# Patient Record
Sex: FEMALE | Race: BLACK OR AFRICAN AMERICAN | URBAN - METROPOLITAN AREA
[De-identification: names, ages, dates, MRNs, and addresses within clinical notes are randomized per-mention and may not be internally consistent; named-entity substitution may affect disease eponyms.]

---

## 2021-12-30 ENCOUNTER — OFFICE VISIT (OUTPATIENT)
Dept: URBAN - METROPOLITAN AREA CLINIC 17 | Facility: CLINIC | Age: 51
End: 2021-12-30

## 2021-12-30 RX ORDER — ALPRAZOLAM 0.25 MG/1
TABLET ORAL
Qty: 0 | Refills: 0 | COMMUNITY
Start: 1900-01-01

## 2021-12-30 RX ORDER — BUTALBITAL, ACETAMINOPHEN, AND CAFFEINE 50; 300; 40 MG/1; MG/1; MG/1
CAPSULE ORAL
Qty: 0 | Refills: 0 | COMMUNITY
Start: 1900-01-01

## 2021-12-31 ENCOUNTER — TELEPHONE ENCOUNTER (OUTPATIENT)
Dept: URBAN - METROPOLITAN AREA CLINIC 92 | Facility: CLINIC | Age: 51
End: 2021-12-31

## 2021-12-31 ENCOUNTER — OFFICE VISIT (OUTPATIENT)
Dept: URBAN - METROPOLITAN AREA TELEHEALTH 2 | Facility: TELEHEALTH | Age: 51
End: 2021-12-31
Payer: COMMERCIAL

## 2021-12-31 DIAGNOSIS — K59.01 CONSTIPATION BY DELAYED COLONIC TRANSIT: ICD-10-CM

## 2021-12-31 DIAGNOSIS — K62.5 RECTAL BLEEDING: ICD-10-CM

## 2021-12-31 DIAGNOSIS — K21.9 GERD WITHOUT ESOPHAGITIS: ICD-10-CM

## 2021-12-31 DIAGNOSIS — K44.9 HIATAL HERNIA: ICD-10-CM

## 2021-12-31 PROCEDURE — 99214 OFFICE O/P EST MOD 30 MIN: CPT | Performed by: INTERNAL MEDICINE

## 2021-12-31 RX ORDER — SODIUM PICOSULFATE, MAGNESIUM OXIDE, AND ANHYDROUS CITRIC ACID 10; 3.5; 12 MG/160ML; G/160ML; G/160ML
160 ML LIQUID ORAL
Qty: 320 MILLILITER | Refills: 0 | OUTPATIENT
Start: 2021-12-31 | End: 2022-01-01

## 2021-12-31 RX ORDER — BUTALBITAL, ACETAMINOPHEN, AND CAFFEINE 50; 300; 40 MG/1; MG/1; MG/1
CAPSULE ORAL
Qty: 0 | Refills: 0 | COMMUNITY
Start: 1900-01-01

## 2021-12-31 RX ORDER — FAMOTIDINE 40 MG/1
1 TABLET AT BEDTIME TABLET, FILM COATED ORAL ONCE A DAY
Qty: 30 | Refills: 3 | OUTPATIENT
Start: 2021-12-31

## 2021-12-31 RX ORDER — LINACLOTIDE 290 UG/1
1 CAPSULE AT LEAST 30 MINUTES BEFORE THE FIRST MEAL OF THE DAY ON AN EMPTY STOMACH CAPSULE, GELATIN COATED ORAL ONCE A DAY
Qty: 90 | Refills: 3 | OUTPATIENT
Start: 2021-12-31 | End: 2022-12-26

## 2021-12-31 RX ORDER — ALPRAZOLAM 0.25 MG/1
TABLET ORAL
Qty: 0 | Refills: 0 | COMMUNITY
Start: 1900-01-01

## 2021-12-31 NOTE — HPI-TODAY'S VISIT:
This is a 50 yo female here for rectal bleeding.  She was in usual health until 2021 when she had a moderate amount of rectal bleeding.  One week later she noted a significant amount of rectal bleeding.  A colonoscopy  revealed intermal hemorrhoids.  She has a history of chronic constipation.  She was placed on Linzess 290 mcg which was effective.  When her prescription ran out she never requested a refill.   Stool evacuation occurs every 2 days but she does not thave the sensation of complete emptying.  She also reports external hemorrhoids which occassionally itch and result in discomfort.   In  she had external hemorrhoids removal which was very painful.  The patient has a history of long standing GERD.  She was on Omeprazole for a while until her prescription  2 years ago.   She describes belching, heartburn and nocturnal symptoms.  She denies vomiting, early satiety, dysphagia and weight loss.  An EGD  demonstrated a hiatal hernia.

## 2022-01-12 ENCOUNTER — OFFICE VISIT (OUTPATIENT)
Dept: URBAN - METROPOLITAN AREA SURGERY CENTER 30 | Facility: SURGERY CENTER | Age: 52
End: 2022-01-12

## 2022-01-12 ENCOUNTER — OFFICE VISIT (OUTPATIENT)
Dept: URBAN - METROPOLITAN AREA SURGERY CENTER 30 | Facility: SURGERY CENTER | Age: 52
End: 2022-01-12
Payer: COMMERCIAL

## 2022-01-12 DIAGNOSIS — K62.5 ANAL BLEEDING: ICD-10-CM

## 2022-01-12 PROCEDURE — 45378 DIAGNOSTIC COLONOSCOPY: CPT | Performed by: INTERNAL MEDICINE

## 2022-01-12 PROCEDURE — G8907 PT DOC NO EVENTS ON DISCHARG: HCPCS | Performed by: INTERNAL MEDICINE

## 2022-02-21 ENCOUNTER — TELEPHONE ENCOUNTER (OUTPATIENT)
Dept: URBAN - METROPOLITAN AREA CLINIC 92 | Facility: CLINIC | Age: 52
End: 2022-02-21

## 2022-02-21 ENCOUNTER — OFFICE VISIT (OUTPATIENT)
Dept: URBAN - METROPOLITAN AREA TELEHEALTH 2 | Facility: TELEHEALTH | Age: 52
End: 2022-02-21
Payer: COMMERCIAL

## 2022-02-21 DIAGNOSIS — K62.5 RECTAL BLEEDING: ICD-10-CM

## 2022-02-21 DIAGNOSIS — K44.9 HIATAL HERNIA: ICD-10-CM

## 2022-02-21 DIAGNOSIS — K21.9 GERD WITHOUT ESOPHAGITIS: ICD-10-CM

## 2022-02-21 DIAGNOSIS — K59.01 CONSTIPATION BY DELAYED COLONIC TRANSIT: ICD-10-CM

## 2022-02-21 DIAGNOSIS — K64.9 HEMORRHOIDS, UNSPECIFIED HEMORRHOID TYPE: ICD-10-CM

## 2022-02-21 PROCEDURE — 99213 OFFICE O/P EST LOW 20 MIN: CPT | Performed by: INTERNAL MEDICINE

## 2022-02-21 RX ORDER — HYDROCORTISONE ACETATE 25 MG/1
1 SUPPOSITORY SUPPOSITORY RECTAL TWICE A DAY
Qty: 60 | Refills: 0 | OUTPATIENT
Start: 2022-02-21 | End: 2022-03-23

## 2022-02-21 RX ORDER — TRAMADOL HYDROCHLORIDE 50 MG/1
TAKE ONE TABLET BY MOUTH EVERY 12 HOURS AS NEEDED FOR SEVERE PAIN TABLET ORAL
Qty: 60 | Refills: 0 | Status: ACTIVE | COMMUNITY

## 2022-02-21 RX ORDER — METAXALONE 800 MG/1
TAKE ONE TABLET BY MOUTH THREE TIMES A DAY FOR 3 DAYS TABLET ORAL
Qty: 10 | Refills: 0 | Status: ACTIVE | COMMUNITY

## 2022-02-21 RX ORDER — HYDROCORTISONE ACETATE 25 MG/1
1 SUPPOSITORY SUPPOSITORY RECTAL TWICE A DAY
Qty: 60 | Refills: 1 | OUTPATIENT

## 2022-02-21 RX ORDER — ALPRAZOLAM 0.25 MG/1
TABLET ORAL
Qty: 0 | Refills: 0 | Status: ACTIVE | COMMUNITY
Start: 1900-01-01

## 2022-02-21 RX ORDER — LINACLOTIDE 290 UG/1
1 CAPSULE AT LEAST 30 MINUTES BEFORE THE FIRST MEAL OF THE DAY ON AN EMPTY STOMACH CAPSULE, GELATIN COATED ORAL ONCE A DAY
OUTPATIENT
Start: 2021-12-31 | End: 2022-12-26

## 2022-02-21 RX ORDER — FREMANEZUMAB-VFRM 225 MG/1.5ML
INJECT ENTIRE CONTENTS OF ONE SYRINGE UNDER THE SKIN EVERY 28 DAYS INJECTION SUBCUTANEOUS
Qty: 1.5 | Refills: 5 | Status: ACTIVE | COMMUNITY

## 2022-02-21 RX ORDER — FAMOTIDINE 40 MG/1
1 TABLET AT BEDTIME TABLET, FILM COATED ORAL ONCE A DAY
Qty: 30 | Refills: 3 | OUTPATIENT

## 2022-02-21 RX ORDER — GABAPENTIN 100 MG/1
TAKE ONE CAPSULE BY MOUTH THREE TIMES A DAY CAPSULE ORAL
Qty: 90 | Refills: 1 | Status: ACTIVE | COMMUNITY

## 2022-02-21 RX ORDER — RIMEGEPANT SULFATE 75 MG/75MG
DISSOLVE ONE TABLET BY MOUTH ONE TIME DAILY AS NEEDED FOR MIGRAINE  MAXIMUM 1 DAILY TABLET, ORALLY DISINTEGRATING ORAL
Qty: 10 | Refills: 0 | Status: ACTIVE | COMMUNITY

## 2022-02-21 RX ORDER — LINACLOTIDE 145 UG/1
1 CAPSULE AT LEAST 30 MINUTES BEFORE THE FIRST MEAL CAPSULE, GELATIN COATED ORAL ONCE A DAY
Qty: 30 | Refills: 2 | OUTPATIENT
Start: 2022-02-21 | End: 2022-05-22

## 2022-02-21 RX ORDER — HYDROCORTISONE ACETATE 25 MG/1
1 SUPPOSITORY SUPPOSITORY RECTAL THREE TIMES A DAY
Qty: 90 | OUTPATIENT
Start: 2022-02-21 | End: 2022-03-23

## 2022-02-21 RX ORDER — FAMOTIDINE 40 MG/1
1 TABLET AT BEDTIME TABLET, FILM COATED ORAL ONCE A DAY
Qty: 30 | Refills: 3 | Status: ACTIVE | COMMUNITY
Start: 2021-12-31

## 2022-02-21 RX ORDER — LINACLOTIDE 290 UG/1
1 CAPSULE AT LEAST 30 MINUTES BEFORE THE FIRST MEAL OF THE DAY ON AN EMPTY STOMACH CAPSULE, GELATIN COATED ORAL ONCE A DAY
Qty: 90 | Refills: 3 | Status: ACTIVE | COMMUNITY
Start: 2021-12-31 | End: 2022-12-26

## 2022-02-21 RX ORDER — BUTALBITAL, ACETAMINOPHEN, AND CAFFEINE 50; 300; 40 MG/1; MG/1; MG/1
CAPSULE ORAL
Qty: 0 | Refills: 0 | COMMUNITY
Start: 1900-01-01

## 2022-02-21 RX ORDER — BUPROPION HYDROCHLORIDE 150 MG/1
TAKE ONE TABLET BY MOUTH ONE TIME DAILY TABLET, EXTENDED RELEASE ORAL
Qty: 90 | Refills: 0 | Status: ACTIVE | COMMUNITY

## 2022-02-21 NOTE — HPI-OTHER HISTORIES
(2021) This is a 50 yo female here for rectal bleeding.  She was in usual health until 2021 when she had a moderate amount of rectal bleeding.  One week later she noted a significant amount of rectal bleeding.  A colonoscopy  revealed intermal hemorrhoids.  She has a history of chronic constipation.  She was placed on Linzess 290 mcg which was effective.  When her prescription ran out she never requested a refill.   Stool evacuation occurs every 2 days but she does not thave the sensation of complete emptying.  She also reports external hemorrhoids which occassionally itch and result in discomfort.   In  she had external hemorrhoids removal which was very painful.  The patient has a history of long standing GERD.  She was on Omeprazole for a while until her prescription  2 years ago.   She describes belching, heartburn and nocturnal symptoms.  She denies vomiting, early satiety, dysphagia and weight loss.  An EGD  demonstrated a hiatal hernia.

## 2022-02-21 NOTE — HPI-TODAY'S VISIT:
The patient is here for follow up of  constipation and a  colonoscopy performed for rectal bleeding.  Linzess 290 mcg was prescribed.  Four days after taking the medication she developed severe stomach cramping and diarrhea.  She has never been on Trulance..  The colonscopy demonstrated hemorrhoids.  She denies rectal bleeding but admits to discomfort.  Preo H suppositories she reports are too long.  She is requesting Anusol which she had in the past.

## 2022-04-20 ENCOUNTER — DASHBOARD ENCOUNTERS (OUTPATIENT)
Age: 52
End: 2022-04-20

## 2022-04-27 ENCOUNTER — OFFICE VISIT (OUTPATIENT)
Dept: URBAN - METROPOLITAN AREA TELEHEALTH 2 | Facility: TELEHEALTH | Age: 52
End: 2022-04-27
Payer: COMMERCIAL

## 2022-04-27 DIAGNOSIS — K21.9 GERD WITHOUT ESOPHAGITIS: ICD-10-CM

## 2022-04-27 DIAGNOSIS — K62.5 RECTAL BLEEDING: ICD-10-CM

## 2022-04-27 DIAGNOSIS — K64.8 OTHER HEMORRHOIDS: ICD-10-CM

## 2022-04-27 DIAGNOSIS — K59.01 CONSTIPATION BY DELAYED COLONIC TRANSIT: ICD-10-CM

## 2022-04-27 PROBLEM — 235595009 GASTROESOPHAGEAL REFLUX DISEASE: Status: ACTIVE | Noted: 2022-02-21

## 2022-04-27 PROBLEM — 35298007: Status: ACTIVE | Noted: 2021-12-31

## 2022-04-27 PROCEDURE — 99213 OFFICE O/P EST LOW 20 MIN: CPT | Performed by: INTERNAL MEDICINE

## 2022-04-27 RX ORDER — FAMOTIDINE 40 MG/1
1 TABLET AT BEDTIME TABLET, FILM COATED ORAL ONCE A DAY
Qty: 30 | Refills: 3 | Status: ACTIVE | COMMUNITY

## 2022-04-27 RX ORDER — PLECANATIDE 3 MG/1
1 TABLET TABLET ORAL ONCE A DAY
Qty: 30 | Refills: 3 | OUTPATIENT
Start: 2022-04-27 | End: 2022-08-25

## 2022-04-27 RX ORDER — LINACLOTIDE 145 UG/1
1 CAPSULE AT LEAST 30 MINUTES BEFORE THE FIRST MEAL CAPSULE, GELATIN COATED ORAL ONCE A DAY
Qty: 30 | Refills: 2 | Status: ACTIVE | COMMUNITY
Start: 2022-02-21 | End: 2022-05-22

## 2022-04-27 RX ORDER — TRAMADOL HYDROCHLORIDE 50 MG/1
TAKE ONE TABLET BY MOUTH EVERY 12 HOURS AS NEEDED FOR SEVERE PAIN TABLET ORAL
Qty: 60 | Refills: 0 | Status: ACTIVE | COMMUNITY

## 2022-04-27 RX ORDER — FREMANEZUMAB-VFRM 225 MG/1.5ML
INJECT ENTIRE CONTENTS OF ONE SYRINGE UNDER THE SKIN EVERY 28 DAYS INJECTION SUBCUTANEOUS
Qty: 1.5 | Refills: 5 | Status: ACTIVE | COMMUNITY

## 2022-04-27 RX ORDER — GABAPENTIN 100 MG/1
TAKE ONE CAPSULE BY MOUTH THREE TIMES A DAY CAPSULE ORAL
Qty: 90 | Refills: 1 | Status: ACTIVE | COMMUNITY

## 2022-04-27 RX ORDER — BUPROPION HYDROCHLORIDE 150 MG/1
TAKE ONE TABLET BY MOUTH ONE TIME DAILY TABLET, EXTENDED RELEASE ORAL
Qty: 90 | Refills: 0 | Status: ACTIVE | COMMUNITY

## 2022-04-27 RX ORDER — HYDROCORTISONE ACETATE 25 MG/1
1 SUPPOSITORY SUPPOSITORY RECTAL TWICE A DAY
Qty: 60 | Refills: 1 | Status: ACTIVE | COMMUNITY

## 2022-04-27 RX ORDER — BUTALBITAL, ACETAMINOPHEN, AND CAFFEINE 50; 300; 40 MG/1; MG/1; MG/1
CAPSULE ORAL
Qty: 0 | Refills: 0 | Status: DISCONTINUED | COMMUNITY
Start: 1900-01-01

## 2022-04-27 RX ORDER — RIMEGEPANT SULFATE 75 MG/75MG
DISSOLVE ONE TABLET BY MOUTH ONE TIME DAILY AS NEEDED FOR MIGRAINE  MAXIMUM 1 DAILY TABLET, ORALLY DISINTEGRATING ORAL
Qty: 10 | Refills: 0 | Status: ACTIVE | COMMUNITY

## 2022-04-27 RX ORDER — ALPRAZOLAM 0.25 MG/1
TABLET ORAL
Qty: 0 | Refills: 0 | Status: ACTIVE | COMMUNITY
Start: 1900-01-01

## 2022-04-27 RX ORDER — HYDROCORTISONE ACETATE 25 MG/1
1 SUPPOSITORY SUPPOSITORY RECTAL TWICE A DAY
Qty: 60 | Refills: 1 | OUTPATIENT

## 2022-04-27 RX ORDER — METAXALONE 800 MG/1
TAKE ONE TABLET BY MOUTH THREE TIMES A DAY FOR 3 DAYS TABLET ORAL
Qty: 10 | Refills: 0 | Status: ACTIVE | COMMUNITY

## 2022-04-27 NOTE — HPI-TODAY'S VISIT:
Ths is a 50 yo female here for follow up of constipation and hemorroids.  Linzess 145 mcg prescribed last visit caused too much diarrhea and cramoing.   Anusol suppositories were prescribed last visit which worked better than Prep H.

## 2022-04-27 NOTE — HPI-OTHER HISTORIES
(2021) This is a 50 yo female here for rectal bleeding.  She was in usual health until 2021 when she had a moderate amount of rectal bleeding.  One week later she noted a significant amount of rectal bleeding.  A colonoscopy  revealed intermal hemorrhoids.  She has a history of chronic constipation.  She was placed on Linzess 290 mcg which was effective.  When her prescription ran out she never requested a refill.   Stool evacuation occurs every 2 days but she does not thave the sensation of complete emptying.  She also reports external hemorrhoids which occassionally itch and result in discomfort.   In  she had external hemorrhoids removal which was very painful.  The patient has a history of long standing GERD.  She was on Omeprazole for a while until her prescription  2 years ago.   She describes belching, heartburn and nocturnal symptoms.  She denies vomiting, early satiety, dysphagia and weight loss.  An EGD  demonstrated a hiatal hernia.   (2022) The patient is here for follow up of  constipation and a  colonoscopy performed for rectal bleeding.  Linzess 290 mcg was prescribed.  Four days after taking the medication she developed severe stomach cramping and diarrhea.  She has never been on Trulance..  The colonscopy demonstrated hemorrhoids.  She denies rectal bleeding but admits to discomfort.  Preo H suppositories she reports are too long.  She is requesting Anusol which she had in the past.

## 2022-05-12 ENCOUNTER — TELEPHONE ENCOUNTER (OUTPATIENT)
Dept: URBAN - METROPOLITAN AREA CLINIC 95 | Facility: CLINIC | Age: 52
End: 2022-05-12

## 2022-05-16 ENCOUNTER — TELEPHONE ENCOUNTER (OUTPATIENT)
Dept: URBAN - METROPOLITAN AREA CLINIC 92 | Facility: CLINIC | Age: 52
End: 2022-05-16

## 2022-05-16 RX ORDER — HYDROCORTISONE ACETATE 25 MG/1
1 SUPPOSITORY SUPPOSITORY RECTAL TWICE A DAY
Qty: 60 | Refills: 1 | Status: ACTIVE | COMMUNITY

## 2022-05-16 RX ORDER — FAMOTIDINE 40 MG/1
1 TABLET AT BEDTIME TABLET, FILM COATED ORAL ONCE A DAY
Qty: 30 | Refills: 3 | Status: ACTIVE | COMMUNITY

## 2022-05-16 RX ORDER — GABAPENTIN 100 MG/1
TAKE ONE CAPSULE BY MOUTH THREE TIMES A DAY CAPSULE ORAL
Qty: 90 | Refills: 1 | Status: ACTIVE | COMMUNITY

## 2022-05-16 RX ORDER — BUPROPION HYDROCHLORIDE 150 MG/1
TAKE ONE TABLET BY MOUTH ONE TIME DAILY TABLET, EXTENDED RELEASE ORAL
Qty: 90 | Refills: 0 | Status: ACTIVE | COMMUNITY

## 2022-05-16 RX ORDER — LUBIPROSTONE 24 UG/1
1 CAPSULE WITH FOOD AND WATER CAPSULE, GELATIN COATED ORAL TWICE A DAY
Qty: 60 | Refills: 3 | OUTPATIENT
Start: 2022-05-16 | End: 2022-09-13

## 2022-05-16 RX ORDER — RIMEGEPANT SULFATE 75 MG/75MG
DISSOLVE ONE TABLET BY MOUTH ONE TIME DAILY AS NEEDED FOR MIGRAINE  MAXIMUM 1 DAILY TABLET, ORALLY DISINTEGRATING ORAL
Qty: 10 | Refills: 0 | Status: ACTIVE | COMMUNITY

## 2022-05-16 RX ORDER — TRAMADOL HYDROCHLORIDE 50 MG/1
TAKE ONE TABLET BY MOUTH EVERY 12 HOURS AS NEEDED FOR SEVERE PAIN TABLET ORAL
Qty: 60 | Refills: 0 | Status: ACTIVE | COMMUNITY

## 2022-05-16 RX ORDER — ALPRAZOLAM 0.25 MG/1
TABLET ORAL
Qty: 0 | Refills: 0 | Status: ACTIVE | COMMUNITY
Start: 1900-01-01

## 2022-05-16 RX ORDER — LINACLOTIDE 145 UG/1
1 CAPSULE AT LEAST 30 MINUTES BEFORE THE FIRST MEAL CAPSULE, GELATIN COATED ORAL ONCE A DAY
Qty: 30 | Refills: 2 | Status: ACTIVE | COMMUNITY
Start: 2022-02-21 | End: 2022-05-22

## 2022-05-16 RX ORDER — PLECANATIDE 3 MG/1
1 TABLET TABLET ORAL ONCE A DAY
Qty: 30 | Refills: 3 | Status: ACTIVE | COMMUNITY
Start: 2022-04-27 | End: 2022-08-25

## 2022-05-16 RX ORDER — METAXALONE 800 MG/1
TAKE ONE TABLET BY MOUTH THREE TIMES A DAY FOR 3 DAYS TABLET ORAL
Qty: 10 | Refills: 0 | Status: ACTIVE | COMMUNITY

## 2022-05-16 RX ORDER — FREMANEZUMAB-VFRM 225 MG/1.5ML
INJECT ENTIRE CONTENTS OF ONE SYRINGE UNDER THE SKIN EVERY 28 DAYS INJECTION SUBCUTANEOUS
Qty: 1.5 | Refills: 5 | Status: ACTIVE | COMMUNITY

## 2022-05-26 ENCOUNTER — TELEPHONE ENCOUNTER (OUTPATIENT)
Dept: URBAN - METROPOLITAN AREA CLINIC 92 | Facility: CLINIC | Age: 52
End: 2022-05-26

## 2022-05-26 RX ORDER — LUBIPROSTONE 24 UG/1
1 CAPSULE WITH FOOD AND WATER CAPSULE, GELATIN COATED ORAL TWICE A DAY
Qty: 180 | Refills: 0
Start: 2022-05-16 | End: 2022-08-24

## 2022-06-10 ENCOUNTER — TELEPHONE ENCOUNTER (OUTPATIENT)
Dept: URBAN - METROPOLITAN AREA CLINIC 92 | Facility: CLINIC | Age: 52
End: 2022-06-10

## 2022-06-10 RX ORDER — LUBIPROSTONE 24 UG/1
1 CAPSULE WITH FOOD AND WATER CAPSULE, GELATIN COATED ORAL TWICE A DAY
Qty: 180 | Refills: 0
Start: 2022-05-16 | End: 2022-09-08

## 2023-03-20 ENCOUNTER — TELEPHONE ENCOUNTER (OUTPATIENT)
Dept: URBAN - METROPOLITAN AREA CLINIC 17 | Facility: CLINIC | Age: 53
End: 2023-03-20

## 2023-03-20 RX ORDER — HYDROCORTISONE ACETATE 25 MG/1
1 SUPPOSITORY SUPPOSITORY RECTAL TWICE A DAY
Qty: 60 | Refills: 1
End: 2023-05-19

## 2023-06-20 ENCOUNTER — OFFICE VISIT (OUTPATIENT)
Dept: URBAN - METROPOLITAN AREA CLINIC 17 | Facility: CLINIC | Age: 53
End: 2023-06-20

## 2023-11-08 ENCOUNTER — TELEPHONE ENCOUNTER (OUTPATIENT)
Dept: URBAN - METROPOLITAN AREA CLINIC 23 | Facility: CLINIC | Age: 53
End: 2023-11-08

## 2025-07-23 ENCOUNTER — OFFICE VISIT (OUTPATIENT)
Dept: URBAN - METROPOLITAN AREA CLINIC 40 | Facility: CLINIC | Age: 55
End: 2025-07-23
Payer: COMMERCIAL

## 2025-07-23 DIAGNOSIS — K64.1 GRADE II INTERNAL HEMORRHOIDS: ICD-10-CM

## 2025-07-23 DIAGNOSIS — K59.09 CHRONIC CONSTIPATION: ICD-10-CM

## 2025-07-23 PROCEDURE — 99203 OFFICE O/P NEW LOW 30 MIN: CPT | Performed by: INTERNAL MEDICINE

## 2025-07-23 PROCEDURE — 46600 DIAGNOSTIC ANOSCOPY SPX: CPT | Performed by: INTERNAL MEDICINE

## 2025-07-23 RX ORDER — ATOGEPANT 10 MG/1
1 TABLET TABLET ORAL ONCE A DAY
Status: ACTIVE | COMMUNITY

## 2025-07-23 RX ORDER — HYDROCORTISONE ACETATE 25 MG/1
1 SUPPOSITORY SUPPOSITORY RECTAL BID
Qty: 28 | Refills: 0 | OUTPATIENT
Start: 2025-07-23 | End: 2025-08-06

## 2025-07-23 RX ORDER — ALPRAZOLAM 0.25 MG/1
TABLET ORAL
Qty: 0 | Refills: 0 | Status: ACTIVE | COMMUNITY
Start: 1900-01-01

## 2025-07-23 RX ORDER — NAPROXEN 250 MG/1
1 TABLET WITH FOOD OR MILK AS NEEDED TABLET ORAL
Status: ACTIVE | COMMUNITY

## 2025-07-23 NOTE — HPI-TODAY'S VISIT:
Ms. Valle is a 54-year-old black female presenting for new patient evaluation of rectal bleeding and constipation.  She is previously seen by Dr. Sanchez in early 2022.  Colonoscopy in January 2022 to the cecum showed internal hemorrhoids.  She was last seen by Dr. Sanchez in April 2022.  She had tried Linzess without improvement.  Samples of Trulance were given as well as Anusol suppositories for hemorrhoids.  She did not return for follow-up.  Patient states over the last 2 weeks she has had 8 bowel movements.  6 of them she had bright red blood on the tissue paper and occasionally in the stool with clots.  The last 2 bowel movements were last night and the night before with no blood.  She denies any abdominal pain at the time of bleeding.  She does note some increased gas.  She has been moving her bowels every other day but can go up to 2 to 3 days without a bowel movement.  Stools can be hard as well as soft.  She has had NSAIDs in the form of naproxen over the last week or so.

## 2025-07-23 NOTE — PHYSICAL EXAM GASTROINTESTINAL
Abdomen , soft, nontender, nondistended , no guarding or rigidity , no masses palpable , normal bowel sounds , Liver and Spleen , no hepatomegaly present , no hepatosplenomegaly , liver nontender , spleen not palpable  Rectal: external hemorrhoids, grade II interna hemorrhoids